# Patient Record
Sex: MALE | Race: WHITE | NOT HISPANIC OR LATINO | URBAN - METROPOLITAN AREA
[De-identification: names, ages, dates, MRNs, and addresses within clinical notes are randomized per-mention and may not be internally consistent; named-entity substitution may affect disease eponyms.]

---

## 2019-01-14 ENCOUNTER — EMERGENCY (EMERGENCY)
Facility: HOSPITAL | Age: 29
LOS: 1 days | Discharge: HOME | End: 2019-01-14
Attending: EMERGENCY MEDICINE | Admitting: EMERGENCY MEDICINE
Payer: OTHER MISCELLANEOUS

## 2019-01-14 VITALS
HEART RATE: 100 BPM | SYSTOLIC BLOOD PRESSURE: 140 MMHG | RESPIRATION RATE: 16 BRPM | TEMPERATURE: 98 F | OXYGEN SATURATION: 97 % | DIASTOLIC BLOOD PRESSURE: 86 MMHG

## 2019-01-14 DIAGNOSIS — S82.009A UNSPECIFIED FRACTURE OF UNSPECIFIED PATELLA, INITIAL ENCOUNTER FOR CLOSED FRACTURE: Chronic | ICD-10-CM

## 2019-01-14 PROCEDURE — 99283 EMERGENCY DEPT VISIT LOW MDM: CPT

## 2019-01-14 NOTE — ED PROVIDER NOTE - PROGRESS NOTE DETAILS
MELISSA Tovar: Spoke w/ tox fellow, states if EKG wnl no acute intervention needed. Pt states he is tired, but attributes that to working for over 12 hours. Normal exam. Strict return precautions given.
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
## 2019-01-14 NOTE — ED PROVIDER NOTE - NSFOLLOWUPINSTRUCTIONS_ED_ALL_ED_FT
See your primary care doctor within 24-48 hours. Return to the ER for worsening symptoms or shortness of breath, chest pain, palpitations, confusion, any other concerns.

## 2019-04-10 ENCOUNTER — EMERGENCY (EMERGENCY)
Facility: HOSPITAL | Age: 29
LOS: 1 days | Discharge: HOME | End: 2019-04-10
Attending: EMERGENCY MEDICINE
Payer: COMMERCIAL

## 2019-04-10 VITALS
DIASTOLIC BLOOD PRESSURE: 79 MMHG | TEMPERATURE: 99 F | SYSTOLIC BLOOD PRESSURE: 120 MMHG | HEART RATE: 97 BPM | RESPIRATION RATE: 18 BRPM | OXYGEN SATURATION: 98 %

## 2019-04-10 VITALS
DIASTOLIC BLOOD PRESSURE: 77 MMHG | OXYGEN SATURATION: 99 % | SYSTOLIC BLOOD PRESSURE: 115 MMHG | HEIGHT: 72 IN | HEART RATE: 106 BPM | RESPIRATION RATE: 19 BRPM | WEIGHT: 210.1 LBS | TEMPERATURE: 98 F

## 2019-04-10 DIAGNOSIS — Z98.890 OTHER SPECIFIED POSTPROCEDURAL STATES: Chronic | ICD-10-CM

## 2019-04-10 DIAGNOSIS — S82.009A UNSPECIFIED FRACTURE OF UNSPECIFIED PATELLA, INITIAL ENCOUNTER FOR CLOSED FRACTURE: Chronic | ICD-10-CM

## 2019-04-10 PROCEDURE — 73080 X-RAY EXAM OF ELBOW: CPT

## 2019-04-10 PROCEDURE — 99053 MED SERV 10PM-8AM 24 HR FAC: CPT

## 2019-04-10 PROCEDURE — 73564 X-RAY EXAM KNEE 4 OR MORE: CPT

## 2019-04-10 PROCEDURE — 99283 EMERGENCY DEPT VISIT LOW MDM: CPT | Mod: 25

## 2019-04-10 PROCEDURE — 73080 X-RAY EXAM OF ELBOW: CPT | Mod: 26,RT

## 2019-04-10 PROCEDURE — 99284 EMERGENCY DEPT VISIT MOD MDM: CPT

## 2019-04-10 PROCEDURE — 73564 X-RAY EXAM KNEE 4 OR MORE: CPT | Mod: 26,LT

## 2019-04-10 RX ORDER — IBUPROFEN 200 MG
600 TABLET ORAL ONCE
Qty: 0 | Refills: 0 | Status: COMPLETED | OUTPATIENT
Start: 2019-04-10 | End: 2019-04-10

## 2019-04-10 RX ADMIN — Medication 600 MILLIGRAM(S): at 08:05

## 2019-04-10 RX ADMIN — Medication 600 MILLIGRAM(S): at 08:40
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
## 2019-04-10 NOTE — ED PROVIDER NOTE - PHYSICAL EXAMINATION
Physical Exam:   GEN: adult in no acute distress, AAOx3  HENT: NCAT, MMM, no stridor  EYES: PERRLA, EOMI  RESP: CTAB, no respiratory distress  CV: normal rate and regular rhythm, S1 S2  ABD: soft and NTND  EXT: No edema, No bony deformity of extremities  SKIN: No skin breaks, skin color normal for race  NEURO: CN grossly intact, No focal motor or sensory deficits.   MSK: no C-T-L midline tenderness. R elbow w/ TTP to the olecranon, but no gross deformity or instability, UCL and LUCL intact. L knee w/ mild tenderness over the medial joint line, no other bony deformity or tenderness.   ~ Alek Hoffman MD

## 2019-04-10 NOTE — ED PROVIDER NOTE - ATTENDING CONTRIBUTION TO CARE
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  Able to walk without difficulty but notes some pain in the left knee.  Has pain on the outside of right elbow, but able to completely move elbow.    On exam is well appearing in NAD, afebrile, VSS; left knee is stable, pt can actively range L knee without problem, no gross deformities, no pain with valgus/varus stress, neg ant/post drawer sign, distal pulse/sensation intact.  R elbow has tenderness over olecranon, but able to actively range elbow compleley, no swelling/deformity, distal sensation and radial pulse intact.  R elbow stable.      Xrays show no fractures/dislocation, likely strain; dc with ibuprofen, rest, ice elevation and f/u with ortho as outpatient.
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
## 2019-04-10 NOTE — ED PROVIDER NOTE - NSFOLLOWUPINSTRUCTIONS_ED_ALL_ED_FT
You were evaluated for injuries and had x-rays of the right elbow and left knee performed.  No visible fractures were found on the x-rays.  We recommend you rest the affected joints, take ibuprofen (400mg every  6 hours as needed) and return to regular activities when you feel up to it.  If continuing to experience discomfort, please follow up with an orthopedist for further evaluation.    Return immediately if you have worsening pain, swelling, redness, fever, vomiting, inability to move a joint, or other new/concerning symptoms.
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
## 2019-04-10 NOTE — ED PROVIDER NOTE - NS ED ROS FT
Review of Systems: No fever, no chest pain, no shortness of breath, no abdominal pain, no loss of consciousness, + MSK pain. ~ Alek Hoffman MD

## 2019-04-10 NOTE — ED ADULT NURSE NOTE - CHPI ED NUR SYMPTOMS NEG
no weakness/no deformity/no numbness/no stiffness/no tingling/no back pain/no bruising/no difficulty bearing weight/no fever/no abrasion

## 2022-01-11 ENCOUNTER — EMERGENCY (EMERGENCY)
Facility: HOSPITAL | Age: 32
LOS: 1 days | Discharge: HOME | End: 2022-01-11
Admitting: STUDENT IN AN ORGANIZED HEALTH CARE EDUCATION/TRAINING PROGRAM
Payer: OTHER MISCELLANEOUS

## 2022-01-11 VITALS
OXYGEN SATURATION: 96 % | TEMPERATURE: 98 F | RESPIRATION RATE: 18 BRPM | HEIGHT: 72 IN | DIASTOLIC BLOOD PRESSURE: 66 MMHG | HEART RATE: 89 BPM | SYSTOLIC BLOOD PRESSURE: 128 MMHG

## 2022-01-11 DIAGNOSIS — S82.009A UNSPECIFIED FRACTURE OF UNSPECIFIED PATELLA, INITIAL ENCOUNTER FOR CLOSED FRACTURE: Chronic | ICD-10-CM

## 2022-01-11 DIAGNOSIS — Z98.890 OTHER SPECIFIED POSTPROCEDURAL STATES: Chronic | ICD-10-CM

## 2022-01-11 PROCEDURE — 99284 EMERGENCY DEPT VISIT MOD MDM: CPT

## 2022-01-11 PROCEDURE — 99053 MED SERV 10PM-8AM 24 HR FAC: CPT

## 2022-01-11 NOTE — ED ADULT TRIAGE NOTE - CHIEF COMPLAINT QUOTE
pt c./o right wrist and knee pain. reports limited mobility. right hand swelling noted. pt states pain radiates up his forearm

## 2022-01-12 VITALS
DIASTOLIC BLOOD PRESSURE: 64 MMHG | HEART RATE: 81 BPM | SYSTOLIC BLOOD PRESSURE: 104 MMHG | OXYGEN SATURATION: 97 % | RESPIRATION RATE: 16 BRPM

## 2022-01-12 PROBLEM — Z78.9 OTHER SPECIFIED HEALTH STATUS: Chronic | Status: ACTIVE | Noted: 2019-04-10

## 2022-01-12 PROCEDURE — 73562 X-RAY EXAM OF KNEE 3: CPT | Mod: 26,RT

## 2022-01-12 PROCEDURE — 73110 X-RAY EXAM OF WRIST: CPT | Mod: 26,RT
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
## 2022-01-12 NOTE — ED PROVIDER NOTE - MUSCULOSKELETAL, MLM
right wrist: tenderness to the carpal region. flexion/extension intact. freely moving fingers and all other joints of the upper extremity. right knee: + abrasion flexion/extension intact. ambulating w/o difficulty. Spine appears normal, range of motion is not limited, no muscle or joint tenderness

## 2022-01-12 NOTE — ED PROVIDER NOTE - NSICDXPASTSURGICALHX_GEN_ALL_CORE_FT
PAST SURGICAL HISTORY:  H/O knee surgery patella reconstruction, remove hardware    Patella fracture
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
## 2022-08-03 NOTE — ED PROVIDER NOTE - OTHER FINDINGS
-- DO NOT REPLY / DO NOT REPLY ALL --  -- Message is from the [facility]--    Referral Request  Name of Specialist: Dr Albert Coronel   Provider's specialty: Pulmonary Medicine    Medical condition for referral:  Annual Visit 22    Is this a NEW request?:  -   Referral # Creation Date Referral Status Status Update    [id number] 2021 Authorized            Referral ordered by: Anila Myers      Insurance type: SAME      Payor: [facility] MEDICARE ADVANTAGE / Plan:  BE [id number] / Product Type: [facility]      Preferred Delivery Method   Call when ready for pickup - phone number to notify: [phone]    Caller Information       Type Contact Phone/Fax    2022 03:30 PM CDT Phone (Incoming) Tyrone Hawkins (Self) [phone] (M)          Alternative phone number: None    Turnaround time given to caller:   \"This message will be sent to [state Provider's full name]. The clinical team will return your call as soon as they review your message. Typically, it takes 3 business days to process referral requests.\"  
Referral generated   
twi in lead 3

## 2023-08-21 NOTE — ED PROVIDER NOTE - PR
623 Ear Star Wedge Flap Text: The defect edges were debeveled with a #15 blade scalpel.  Given the location of the defect and the proximity to free margins (helical rim) an ear star wedge flap was deemed most appropriate.  Using a sterile surgical marker, the appropriate flap was drawn incorporating the defect and placing the expected incisions between the helical rim and antihelix where possible.  The area thus outlined was incised through and through with a #15 scalpel blade.

## 2023-10-18 NOTE — ED ADULT TRIAGE NOTE - NS ED NOTE AC HIGH RISK COUNTRIES
Hemostatic gauze applied to open area, covered with gauze, abd, wrapped with ace wrap.    Leave dressing in place and return for nurse visit on Friday and Monday, md visit on Wednesday     Continue antibiotic until complete  May change secondary dressing as needed if excess drainage occurs     If Warmth , redness or pain rapidly increase, or if you have fever and chills, seek emergency care.      Have MRI preformed when scheduled, and follow up with Dr Reyes in his office OCTOBER 24th @ 2pm   No